# Patient Record
Sex: FEMALE | Race: WHITE | Employment: OTHER | URBAN - METROPOLITAN AREA
[De-identification: names, ages, dates, MRNs, and addresses within clinical notes are randomized per-mention and may not be internally consistent; named-entity substitution may affect disease eponyms.]

---

## 2017-05-25 ENCOUNTER — OFFICE VISIT (OUTPATIENT)
Dept: SURGERY | Age: 72
End: 2017-05-25

## 2017-05-25 ENCOUNTER — HOSPITAL ENCOUNTER (OUTPATIENT)
Dept: MAMMOGRAPHY | Age: 72
Discharge: HOME | End: 2017-05-25
Attending: SURGERY | Admitting: SURGERY

## 2017-05-25 VITALS
SYSTOLIC BLOOD PRESSURE: 127 MMHG | WEIGHT: 170 LBS | HEIGHT: 63 IN | BODY MASS INDEX: 30.12 KG/M2 | HEART RATE: 64 BPM | DIASTOLIC BLOOD PRESSURE: 61 MMHG

## 2017-05-25 DIAGNOSIS — Z12.31 ENCOUNTER FOR SCREENING MAMMOGRAM FOR BREAST CANCER: ICD-10-CM

## 2017-05-25 DIAGNOSIS — Z98.890 HISTORY OF BENIGN BREAST BIOPSY: Primary | ICD-10-CM

## 2017-05-25 DIAGNOSIS — Z12.31 SCREENING MAMMOGRAM, ENCOUNTER FOR: ICD-10-CM

## 2017-05-25 PROCEDURE — 99213 OFFICE O/P EST LOW 20 MIN: CPT | Performed by: SURGERY

## 2018-05-31 ENCOUNTER — HOSPITAL ENCOUNTER (OUTPATIENT)
Dept: OTHER | Age: 73
Discharge: HOME | End: 2018-05-31
Attending: SURGERY | Admitting: SURGERY

## 2018-06-18 ENCOUNTER — OFFICE VISIT (OUTPATIENT)
Dept: SURGERY | Age: 73
End: 2018-06-18

## 2018-06-18 ENCOUNTER — HOSPITAL ENCOUNTER (OUTPATIENT)
Dept: OTHER | Age: 73
Discharge: HOME | End: 2018-06-18
Attending: SURGERY | Admitting: SURGERY

## 2018-06-18 VITALS
WEIGHT: 175 LBS | SYSTOLIC BLOOD PRESSURE: 114 MMHG | BODY MASS INDEX: 31.01 KG/M2 | HEIGHT: 63 IN | DIASTOLIC BLOOD PRESSURE: 69 MMHG | TEMPERATURE: 97.4 F | HEART RATE: 61 BPM

## 2018-06-18 DIAGNOSIS — Z12.31 VISIT FOR SCREENING MAMMOGRAM: ICD-10-CM

## 2018-06-18 DIAGNOSIS — N60.19 FIBROCYSTIC BREAST DISEASE (FCBD), UNSPECIFIED LATERALITY: Primary | Chronic | ICD-10-CM

## 2018-06-18 PROCEDURE — 99213 OFFICE O/P EST LOW 20 MIN: CPT | Performed by: SURGERY

## 2019-06-17 ASSESSMENT — ENCOUNTER SYMPTOMS
RESPIRATORY NEGATIVE: 1
GASTROINTESTINAL NEGATIVE: 1
EYES NEGATIVE: 1

## 2019-06-17 NOTE — PROGRESS NOTES
CC: history of multiple benign breast biopsies      Ms. Nikhil Macdonald is a 72y.o.-year-old woman who presents today in follow up for a routine breast evaluation. She has been followed in the past by Dr. Mary Kay Diamond. INTERVAL HISTORY:  Since her last appointment, Ms. Nikhil Macdonald has been doing very well. She has no new complaints or general medical concerns. She has not noticed any new breast masses or skin changes including redness or dimpling. She has not noticed any nipple discharge. On 5/17/2016 she underwent bilateral screening mammogram which preliminarily shows no evidence of malignancy. On 5/25/2017 she underwent bilateral screening mammography. There are no suspicious findings suggestive of malignancy. BI-RADS 1. On 6/18/2018 she underwent bilateral screening mammography. There are no concerning findings suggestive of malignancy. BI-RADS 1. On 6/20/2019 she underwent bilateral screening mammography. There are no concerning findings suggestive of malignancy. BI-RADS 2. Past Medical History:   Diagnosis Date    Varner esophagus     Depression     Hypertension     Osteoporosis      Past Surgical History:   Procedure Laterality Date    APPENDECTOMY      qge 3    BREAST SURGERY      x 3 -- Biopsies     Review of Systems   Constitutional: Negative. HENT: Negative. Eyes: Negative. Respiratory: Negative. Cardiovascular: Negative. Gastrointestinal: Negative. Genitourinary: Negative. Musculoskeletal: Negative. Skin: Negative. Neurological: Negative. Endo/Heme/Allergies: Negative. Psychiatric/Behavioral: Negative. All other systems reviewed and are negative. Exam:  There were no vitals taken for this visit. The physical exam has been reviewed and updated where necessary. Constitutional: She appears well-nourished. No apparent distress. Breast: The patient was examined in the upright and supine position.   She has a \"D\" cup breast. Breasts are symmetrically ptotic with palpable fibroglandular densities. Bilateral well healed scars were present. Right: There were no new masses or changes in breast contour. There were no skin changes of the breast or nipple areolar complex. There was no nipple inversion or discharge. Left: There were no new masses or changes in breast contour. There were no skin changes of the breast or nipple areolar complex. There was no nipple inversion or discharge. There is no axillary lymphadenopathy palpated bilaterally. Head: Normocephalic and atraumatic. Eyes: EOM are normal. Pupils are equal, round, and reactive to light. Neck: Neck supple. No tracheal deviation present. Cardiovascular: regular rate. Extremities appear well perfused. Pulmonary: respirations are non-labored and without audible distress  Lymphatics: no palpable axillary or cervical lymphadenopathy. Skin: No rash noted. No erythema. Neurologic: alert and oriented. Assessment/Plan: Ms. Amy Bailey is a 72y.o. year-old woman who presents for a routine breast evaluation  There are no current signs of malignancy. I reviewed with Ms. Amy Bailey her most recent breast imaging. We discussed the recommendations for screening. Self breast evaluation was reviewed. Signs and symptoms of concern were reviewed. At this time: We will see Ms. Amy Bailey back in the office in 1 year for a clinical breast exam.  She will be due for a bilateral screening mammogram at that time which we will schedule for her. We discussed follow-up screening with nurse practitioner. All of Ms. Waldrop Reap questions were answered at this time but she was encouraged to call the office with any additional concerns. Approximately 15 minutes of time were spent in this visit of which 50% or more of the time was related to coordination of care.

## 2019-06-20 ENCOUNTER — HOSPITAL ENCOUNTER (OUTPATIENT)
Dept: WOMENS IMAGING | Age: 74
Discharge: HOME | End: 2019-06-20
Payer: MEDICARE

## 2019-06-20 ENCOUNTER — OFFICE VISIT (OUTPATIENT)
Dept: SURGERY | Age: 74
End: 2019-06-20
Payer: MEDICARE

## 2019-06-20 VITALS
SYSTOLIC BLOOD PRESSURE: 106 MMHG | BODY MASS INDEX: 29.06 KG/M2 | DIASTOLIC BLOOD PRESSURE: 63 MMHG | HEART RATE: 61 BPM | OXYGEN SATURATION: 98 % | HEIGHT: 63 IN | RESPIRATION RATE: 16 BRPM | WEIGHT: 164 LBS

## 2019-06-20 DIAGNOSIS — Z12.39 SCREENING BREAST EXAMINATION: ICD-10-CM

## 2019-06-20 DIAGNOSIS — Z98.890 HISTORY OF BENIGN BREAST BIOPSY: ICD-10-CM

## 2019-06-20 DIAGNOSIS — Z12.39 BREAST CANCER SCREENING: ICD-10-CM

## 2019-06-20 DIAGNOSIS — Z12.31 SCREENING MAMMOGRAM, ENCOUNTER FOR: Primary | ICD-10-CM

## 2019-06-20 PROCEDURE — 99213 OFFICE O/P EST LOW 20 MIN: CPT | Performed by: SURGERY

## 2019-06-20 PROCEDURE — 77067 SCR MAMMO BI INCL CAD: CPT

## 2020-05-13 ENCOUNTER — TELEPHONE (OUTPATIENT)
Dept: SURGERY | Age: 75
End: 2020-05-13

## 2020-05-13 NOTE — TELEPHONE ENCOUNTER
Called patient to cancel her appointment with Buck Tompkins on 6/25/20 due to the COVID-19 schedule change. If the patient calls back please infrom her that her appointment is not scheduled for 7/1/20 mammogram @10 a.m and office visit @11 a.m.

## 2020-09-01 ENCOUNTER — OFFICE VISIT (OUTPATIENT)
Dept: SURGERY | Age: 75
End: 2020-09-01
Payer: MEDICARE

## 2020-09-01 ENCOUNTER — HOSPITAL ENCOUNTER (OUTPATIENT)
Dept: WOMENS IMAGING | Age: 75
Discharge: HOME | End: 2020-09-01
Payer: MEDICARE

## 2020-09-01 VITALS
HEIGHT: 62 IN | WEIGHT: 170 LBS | TEMPERATURE: 97.1 F | HEART RATE: 62 BPM | RESPIRATION RATE: 18 BRPM | DIASTOLIC BLOOD PRESSURE: 70 MMHG | OXYGEN SATURATION: 94 % | BODY MASS INDEX: 31.28 KG/M2 | SYSTOLIC BLOOD PRESSURE: 108 MMHG

## 2020-09-01 PROCEDURE — 77063 BREAST TOMOSYNTHESIS BI: CPT

## 2020-09-01 PROCEDURE — 99213 OFFICE O/P EST LOW 20 MIN: CPT | Performed by: NURSE PRACTITIONER

## 2020-09-01 RX ORDER — ATENOLOL 50 MG/1
TABLET ORAL
COMMUNITY
Start: 2020-07-17

## 2020-09-01 RX ORDER — BENAZEPRIL HYDROCHLORIDE 40 MG/1
40 TABLET, FILM COATED ORAL DAILY
COMMUNITY
Start: 2019-09-02 | End: 2021-10-12 | Stop reason: SDUPTHER

## 2020-09-01 NOTE — PROGRESS NOTES
Tobacco Use    Smoking status: Never Smoker    Smokeless tobacco: Never Used   Substance Use Topics    Alcohol use: Yes     Alcohol/week: 0.0 standard drinks     Comment: Rarely    Drug use: No       Current Outpatient Medications on File Prior to Visit   Medication Sig Dispense Refill    benazepril (LOTENSIN) 40 MG tablet Take 40 mg by mouth daily      atenolol (TENORMIN) 50 MG tablet       Cholecalciferol (VITAMIN D) 2000 UNITS CAPS capsule Take  by mouth.  amLODIPine (NORVASC) 5 MG tablet       benazepril (LOTENSIN) 40 MG tablet       escitalopram (LEXAPRO) 10 MG tablet       NEXIUM 40 MG capsule       Zoledronic Acid (RECLAST IV) Infuse  intravenously. No current facility-administered medications on file prior to visit. Medications: documentation has been reviewed in the electronic medical record and patient office intake form. REVIEW OF SYSTEMS:  Constitutional: Negative for fever  HENT: Negative for sore throat  Eyes: Negative for redness   Respiratory: Negative for dyspnea, cough  Cardiovascular: Negative for chest pain  Gastrointestinal: Negative for vomiting, diarrhea   Genitourinary: Negative for hematuria   Musculoskeletal: Negative for arthralgias   Skin: Negative for rash  Neurological: Negative for syncope  Hematological: Negative for adenopathy  Psychiatric/Behavorial: Negative for anxiety     PHYSICAL EXAM:  /70 (Site: Right Lower Arm)   Pulse 62   Temp 97.1 °F (36.2 °C) (Temporal)   Resp 18   Ht 5' 2\" (1.575 m)   Wt 170 lb (77.1 kg)   SpO2 94%   BMI 31.09 kg/m²   Constitutional: She appearswell-nourished. No apparent distress. Breast: The patient was examined in the upright and supine position. She has a \"D cup breast. Breasts are symmetrically ptotic. Bilateral well healed scars present        Right: No new masses or changes in breast contour. No skin changes of the breast or nipple areolar complex. No nipple inversion or discharge.  No erythema, thickening (peau d'orange), or dimpling. Scattered areas of thickened fibroglandular tissue       Left: No new masses or changes in breast contour. No skin changes of the breast or nipple areolar complex. No nipple inversion or discharge. No erythema, thickening (peau d'orange), or dimpling. Scattered areas of thickened fibroglandular tissue  There is no axillary lymphadenopathy palpated bilaterally. Head: Normocephalic and atraumatic:   Eyes: EOM are normal. Pupils are equal, round, and reactive to light. Neck: Neck supple. No tracheal deviation present. No obvious mass. Cardiovascular: regular rate. Pulmonary: No accessory muscle use. Respirations non-labored and no wheezing. Lymphatics: No palpable supraclavicular, cervical, or axillary lymphadenopathy  Skin: No rash noted. No erythema. Neurologic: alert and oriented. Extremities: appear well perfused. No edema. No joint deformity        ASSESSMENT:  - Screening Breast Examination - stable breast examination and stable bilateral screening mammogram.   - Fibrocystic breast   - History of benign breast biopsy x3       PLAN:    Continue annual screening mammography with tomosynthesis   Continue annual clinical breast exams    Discussed the importance of breast awareness including the importance and technique of self breast exams   Today's imaging was reviewed and the results were discussed with the patient, all questions answered   Education provided for Healthy Lifestyle Recommendations: healthy diet, routine exercise, weight control, decreased alcohol consumption.  Follow up after annual mammogram in 1 year            ALDAIR Vazquez-CNP  [facility])   Surgical Breast Oncology   [phone]     All of the patient's questions were answered at this time however, she was encouraged to call the office with any further inquiries.  Approximately 15 minutes of time were spent in this visit of which 50% or more of the time was related to coordination of care.

## 2021-07-08 ENCOUNTER — APPOINTMENT (RX ONLY)
Dept: URBAN - METROPOLITAN AREA CLINIC 170 | Facility: CLINIC | Age: 76
Setting detail: DERMATOLOGY
End: 2021-07-08

## 2021-07-08 DIAGNOSIS — L92.0 GRANULOMA ANNULARE: ICD-10-CM

## 2021-07-08 PROCEDURE — ? ADDITIONAL NOTES

## 2021-07-08 PROCEDURE — 99203 OFFICE O/P NEW LOW 30 MIN: CPT

## 2021-07-08 PROCEDURE — ? COUNSELING

## 2021-07-08 PROCEDURE — ? PRESCRIPTION

## 2021-07-08 RX ORDER — TRIAMCINOLONE ACETONIDE 1 MG/G
OINTMENT TOPICAL
Qty: 1 | Refills: 2 | Status: ERX | COMMUNITY
Start: 2021-07-08

## 2021-07-08 RX ADMIN — TRIAMCINOLONE ACETONIDE: 1 OINTMENT TOPICAL at 00:00

## 2021-07-08 ASSESSMENT — LOCATION ZONE DERM: LOCATION ZONE: ARM

## 2021-07-08 ASSESSMENT — LOCATION DETAILED DESCRIPTION DERM: LOCATION DETAILED: LEFT PROXIMAL DORSAL FOREARM

## 2021-07-08 ASSESSMENT — LOCATION SIMPLE DESCRIPTION DERM: LOCATION SIMPLE: LEFT FOREARM

## 2021-07-08 NOTE — PROCEDURE: MIPS QUALITY
Quality 431: Preventive Care And Screening: Unhealthy Alcohol Use - Screening: Patient screened for unhealthy alcohol use using a single question and scores less than 2 times per year
Quality 226: Preventive Care And Screening: Tobacco Use: Screening And Cessation Intervention: Patient screened for tobacco use and is an ex/non-smoker
Quality 47: Advance Care Plan: Advance Care Planning discussed and documented in the medical record; patient did not wish or was not able to name a surrogate decision maker or provide an advance care plan.
Quality 111:Pneumonia Vaccination Status For Older Adults: Pneumococcal Vaccination Previously Received
Quality 130: Documentation Of Current Medications In The Medical Record: Current Medications Documented
Detail Level: Detailed

## 2021-07-08 NOTE — PROCEDURE: ADDITIONAL NOTES
Additional Notes: Patient consent was obtained to proceed with the visit and recommended plan of care after discussion of all risks and benefits, including the risks of COVID-19 exposure.
Detail Level: Simple
Additional Notes: Doubt malignancy, pt to call if lesion changes. No concerning features on dermoscopy.
Render Risk Assessment In Note?: no

## 2021-09-07 ENCOUNTER — HOSPITAL ENCOUNTER (OUTPATIENT)
Dept: WOMENS IMAGING | Age: 76
Discharge: HOME | End: 2021-09-07
Payer: MEDICARE

## 2021-09-07 VITALS — HEIGHT: 62 IN | BODY MASS INDEX: 32.02 KG/M2 | WEIGHT: 174 LBS

## 2021-09-07 DIAGNOSIS — Z12.39 ENCOUNTER FOR SCREENING BREAST EXAMINATION: ICD-10-CM

## 2021-09-07 PROCEDURE — 77063 BREAST TOMOSYNTHESIS BI: CPT

## 2021-09-13 ENCOUNTER — TELEPHONE (OUTPATIENT)
Dept: SURGERY | Age: 76
End: 2021-09-13

## 2021-09-13 NOTE — TELEPHONE ENCOUNTER
Left voicemail for patient to call back to reschedule appointment. [place] [employer] is out of the office.

## 2021-10-06 ENCOUNTER — APPOINTMENT (RX ONLY)
Dept: URBAN - METROPOLITAN AREA CLINIC 170 | Facility: CLINIC | Age: 76
Setting detail: DERMATOLOGY
End: 2021-10-06

## 2021-10-06 DIAGNOSIS — D22 MELANOCYTIC NEVI: ICD-10-CM | Status: STABLE

## 2021-10-06 DIAGNOSIS — L81.4 OTHER MELANIN HYPERPIGMENTATION: ICD-10-CM | Status: STABLE

## 2021-10-06 DIAGNOSIS — L92.0 GRANULOMA ANNULARE: ICD-10-CM | Status: INADEQUATELY CONTROLLED

## 2021-10-06 DIAGNOSIS — D18.0 HEMANGIOMA: ICD-10-CM | Status: STABLE

## 2021-10-06 DIAGNOSIS — L82.1 OTHER SEBORRHEIC KERATOSIS: ICD-10-CM | Status: STABLE

## 2021-10-06 PROBLEM — D22.5 MELANOCYTIC NEVI OF TRUNK: Status: ACTIVE | Noted: 2021-10-06

## 2021-10-06 PROBLEM — D18.01 HEMANGIOMA OF SKIN AND SUBCUTANEOUS TISSUE: Status: ACTIVE | Noted: 2021-10-06

## 2021-10-06 PROCEDURE — ? EDUCATIONAL RESOURCES PROVIDED

## 2021-10-06 PROCEDURE — 99214 OFFICE O/P EST MOD 30 MIN: CPT

## 2021-10-06 PROCEDURE — ? COUNSELING

## 2021-10-06 PROCEDURE — ? TREATMENT REGIMEN

## 2021-10-06 PROCEDURE — ? PRESCRIPTION MEDICATION MANAGEMENT

## 2021-10-06 PROCEDURE — ? ADDITIONAL NOTES

## 2021-10-06 PROCEDURE — ? PHOTO-DOCUMENTATION

## 2021-10-06 PROCEDURE — ? FULL BODY SKIN EXAM

## 2021-10-06 PROCEDURE — ? SUNSCREEN RECOMMENDATIONS

## 2021-10-06 ASSESSMENT — LOCATION SIMPLE DESCRIPTION DERM
LOCATION SIMPLE: RIGHT LOWER BACK
LOCATION SIMPLE: RIGHT SHOULDER
LOCATION SIMPLE: UPPER BACK
LOCATION SIMPLE: LEFT FOREARM
LOCATION SIMPLE: ABDOMEN

## 2021-10-06 ASSESSMENT — LOCATION DETAILED DESCRIPTION DERM
LOCATION DETAILED: INFERIOR THORACIC SPINE
LOCATION DETAILED: RIGHT INFERIOR MEDIAL MIDBACK
LOCATION DETAILED: EPIGASTRIC SKIN
LOCATION DETAILED: RIGHT ANTERIOR SHOULDER
LOCATION DETAILED: LEFT PROXIMAL ULNAR DORSAL FOREARM

## 2021-10-06 ASSESSMENT — LOCATION ZONE DERM
LOCATION ZONE: ARM
LOCATION ZONE: TRUNK

## 2021-10-06 NOTE — HPI: EVALUATION OF SKIN LESION(S)
What Type Of Note Output Would You Prefer (Optional)?: Bullet Format
Hpi Title: Evaluation of Skin Lesions
How Severe Are Your Spot(S)?: mild
Have Your Spot(S) Been Treated In The Past?: has not been treated
Additional History: Was seeing a other dermatologist, but her insurance changed and they no longer take it her insurance . She is her for her yearly skin check.

## 2021-10-06 NOTE — PROCEDURE: PRESCRIPTION MEDICATION MANAGEMENT
Initiate Treatment: Since patient never started triamcinolone, recommend she call pharmacy and start Triam 0.1% cream b.i.d. X up to two weeks PRN redness
Detail Level: Detailed
Render In Strict Bullet Format?: No

## 2021-10-12 ENCOUNTER — OFFICE VISIT (OUTPATIENT)
Dept: SURGERY | Age: 76
End: 2021-10-12
Payer: MEDICARE

## 2021-10-12 VITALS
HEART RATE: 66 BPM | DIASTOLIC BLOOD PRESSURE: 70 MMHG | SYSTOLIC BLOOD PRESSURE: 126 MMHG | WEIGHT: 175 LBS | BODY MASS INDEX: 32.01 KG/M2 | OXYGEN SATURATION: 95 %

## 2021-10-12 DIAGNOSIS — Z98.890 HISTORY OF BENIGN BREAST BIOPSY: ICD-10-CM

## 2021-10-12 DIAGNOSIS — Z12.31 ENCOUNTER FOR SCREENING MAMMOGRAM FOR BREAST CANCER: ICD-10-CM

## 2021-10-12 DIAGNOSIS — N60.19 FIBROCYSTIC BREAST DISEASE (FCBD), UNSPECIFIED LATERALITY: ICD-10-CM

## 2021-10-12 DIAGNOSIS — Z12.39 ENCOUNTER FOR SCREENING BREAST EXAMINATION: Primary | ICD-10-CM

## 2021-10-12 PROCEDURE — 99213 OFFICE O/P EST LOW 20 MIN: CPT | Performed by: NURSE PRACTITIONER

## 2021-10-12 NOTE — PATIENT INSTRUCTIONS
overlap. ? Use three levels of pressure to feel of all your breast tissue. Use light pressure to feel the tissue close to the skin surface. Use medium pressure to feel a little deeper. Use firm pressure to feel your tissue close to your breastbone and ribs. Use each pressure level to feel your breast tissue before moving on to the next spot. ? Check your entire breast, moving up and down as if following a strip from the collarbone to the bra line, and from the armpit to the ribs. Repeat until you have covered the entire breast.  ? Repeat this procedure for your left breast, using the pads of the 3 middle fingers of your right hand. · To examine your breasts while in the shower:  ? Place one arm over your head and lightly soap your breast on that side. ? Using the pads of your fingers, gently move your hand over your breast (in the strip pattern described above), feeling carefully for any lumps or changes. ? Repeat for the other breast.  · Have your doctor inspect anything you notice to see if you need further testing. Where can you learn more? Go to https://[facility]. org and sign in to your [employer] account. Enter P148 in the [employer] box to learn more about \"Breast Self-Exam: Care Instructions. \"     If you do not have an account, please click on the \"Sign Up Now\" link. Current as of: December 17, 2020               Content Version: 13.0  © 4617-9790 [employer]. Care instructions adapted under license by [facility] ([facility]). If you have questions about a medical condition or this instruction, always ask your healthcare professional. [place] [address] any warranty or liability for your use of this information.

## 2021-10-12 NOTE — PROGRESS NOTES
Surgical Breast Oncology        CC: One year [id number] for one-year breast check and mammogram     HPI: Francois Salas is a 70 y.o. woman here for annual follow up for routine breast evaluation. She has been followed in the past by Dr. Cindy Schrader and most recently Dr. Kurt Henson. She states that she does not routinely perform self breast evaluations, however has not noticed any new abnormalities such as masses, skin changes, color changes,nipple discharge, or changes to the nipple-areolar complex. No personal history of cancer. Mammogram today reviewed, discussed with the patient and documented below. INTERVAL HISTORY:  On 5/17/2016 she underwent bilateral screening mammogram which preliminarily shows no evidence of malignancy.     On 5/25/2017 she underwent bilateral screening mammography. There are no suspicious findings suggestive of malignancy. BI-RADS 1.     On 6/18/2018 she underwent bilateral screening mammography. There are no concerning findings suggestive of malignancy. BI-RADS 1.     On 6/20/2019 she underwent bilateral screening mammography. There are no concerning findings suggestive of malignancy. BI-RADS 2. On 9/1/2020 she underwent bilateral screening mammography. Stable postoperative changes noted in bilateral breast.  Stable microcalcifications are noted in both breast.  There are no concerning findings suggestive of malignancy. BI-RADS 2. On 9/7/2021 children bilateral screening mammography. No new concerning findings suggestive of malignancy. BI-RADS 1.       Past Medical History:   Diagnosis Date    Varner esophagus     Depression     Hypertension     Osteoporosis        Past Surgical History:   Procedure Laterality Date    APPENDECTOMY      qge 3    BREAST BIOPSY      BREAST SURGERY      x 3 -- Biopsies    HYSTERECTOMY      OVARY REMOVAL         No family history on file.     Allergies as of 10/12/2021 - Fully Reviewed 10/12/2021   Allergen Reaction Noted    Sulfa antibiotics Shortness Of Breath 04/25/2013    Lipitor Other (See Comments) 04/25/2013    Hydrochlorothiazide  04/16/2015       Social History     Tobacco Use    Smoking status: Never Smoker    Smokeless tobacco: Never Used   Vaping Use    Vaping Use: Never used   Substance Use Topics    Alcohol use: Yes     Alcohol/week: 0.0 standard drinks     Comment: Rarely    Drug use: No       Current Outpatient Medications on File Prior to Visit   Medication Sig Dispense Refill    atenolol (TENORMIN) 50 MG tablet       Cholecalciferol (VITAMIN D) 2000 UNITS CAPS capsule Take  by mouth.  amLODIPine (NORVASC) 5 MG tablet       benazepril (LOTENSIN) 40 MG tablet       escitalopram (LEXAPRO) 10 MG tablet       NEXIUM 40 MG capsule        No current facility-administered medications on file prior to visit. Medications: documentation has been reviewed in the electronic medical record and patient office intake form. REVIEW OF SYSTEMS:  Constitutional: Negative for fever  HENT: Negative for sore throat  Eyes: Negative for redness   Respiratory: Negative for dyspnea, cough  Cardiovascular: Negative for chest pain  Gastrointestinal: Negative for vomiting, diarrhea   Genitourinary: Negative for hematuria   Musculoskeletal: Negative for arthralgias   Skin: Negative for rash  Neurological: Negative for syncope  Hematological: Negative for adenopathy  Psychiatric/Behavorial: Negative for anxiety     PHYSICAL EXAM:  /70   Pulse 66   Wt 175 lb (79.4 kg)   SpO2 95%   BMI 32.01 kg/m²   Constitutional: She appearswell-nourished. No apparent distress. Breast: The patient was examined in the upright and supine position. She has a \"D cup breast. Breasts are symmetrically ptotic. Bilateral well healed scars present        Right: No new masses or changes in breast contour. No skin changes of the breast or nipple areolar complex. No nipple inversion or discharge.  No erythema, thickening (peau d'orange), or dimpling. Scattered areas of thickened fibroglandular tissue       Left: No new masses or changes in breast contour. No skin changes of the breast or nipple areolar complex. No nipple inversion or discharge. No erythema, thickening (peau d'orange), or dimpling. Scattered areas of thickened fibroglandular tissue  There is no axillary lymphadenopathy palpated bilaterally. Head: Normocephalic and atraumatic:   Eyes: EOM are normal. Pupils are equal, round, and reactive to light. Neck: Neck supple. No tracheal deviation present. No obvious mass. Lymphatics: No palpable supraclavicular, cervical, or axillary lymphadenopathy  Skin: No rash noted. No erythema. Neurologic: alert and oriented. Extremities: appear well perfused. ASSESSMENT:  - Screening Breast Examination - stable breast examination and stable bilateral screening mammogram.   - Fibrocystic breast   - History of benign breast biopsy x3       PLAN:    Continue annual screening mammography with tomosynthesis   Continue annual clinical breast exams    Discussed the importance of breast awareness including the importance and technique of self breast exams   Today's imaging was reviewed and the results were discussed with the patient, all questions answered   Education provided for Healthy Lifestyle Recommendations: healthy diet, routine exercise, weight control, decreased alcohol consumption.  Follow up after annual mammogram in 1 year            ALDAIR Hummel-CNP  [facility])   Surgical Breast Oncology   [phone]     All of the patient's questions were answered at this time however, she was encouraged to call the office with any further inquiries. Approximately 20 minutes of time were spent in preparation, direct patient contact, counseling, care coordination, documentation and activities otherwise related to this encounter.

## 2022-09-21 ENCOUNTER — OFFICE VISIT (OUTPATIENT)
Dept: SURGERY | Age: 77
End: 2022-09-21
Payer: MEDICARE

## 2022-09-21 ENCOUNTER — HOSPITAL ENCOUNTER (OUTPATIENT)
Dept: WOMENS IMAGING | Age: 77
Discharge: HOME | End: 2022-09-21
Payer: MEDICARE

## 2022-09-21 VITALS — HEIGHT: 62 IN | WEIGHT: 173 LBS | BODY MASS INDEX: 31.83 KG/M2

## 2022-09-21 VITALS — BODY MASS INDEX: 31.93 KG/M2 | DIASTOLIC BLOOD PRESSURE: 72 MMHG | SYSTOLIC BLOOD PRESSURE: 114 MMHG | WEIGHT: 174.6 LBS

## 2022-09-21 DIAGNOSIS — Z12.39 ENCOUNTER FOR SCREENING BREAST EXAMINATION: Primary | ICD-10-CM

## 2022-09-21 DIAGNOSIS — Z12.31 ENCOUNTER FOR SCREENING MAMMOGRAM FOR BREAST CANCER: ICD-10-CM

## 2022-09-21 DIAGNOSIS — N60.19 FIBROCYSTIC BREAST DISEASE (FCBD), UNSPECIFIED LATERALITY: ICD-10-CM

## 2022-09-21 DIAGNOSIS — Z98.890 HISTORY OF BENIGN BREAST BIOPSY: ICD-10-CM

## 2022-09-21 DIAGNOSIS — Z12.31 ENCOUNTER FOR SCREENING MAMMOGRAM FOR BREAST CANCER: Primary | ICD-10-CM

## 2022-09-21 DIAGNOSIS — Z12.31 BREAST CANCER SCREENING BY MAMMOGRAM: ICD-10-CM

## 2022-09-21 PROCEDURE — 1123F ACP DISCUSS/DSCN MKR DOCD: CPT | Performed by: NURSE PRACTITIONER

## 2022-09-21 PROCEDURE — 99213 OFFICE O/P EST LOW 20 MIN: CPT | Performed by: NURSE PRACTITIONER

## 2022-09-21 PROCEDURE — 77063 BREAST TOMOSYNTHESIS BI: CPT

## 2022-09-21 NOTE — PROGRESS NOTES
Surgical Breast Oncology        CC: One year [id number] for one-year breast check and mammogram     HPI: Rosario Giron is a 78 y. o. woman here for annual follow up for routine breast evaluation. She has been followed in the past by Dr. Shaheen Yoder and most recently Dr. Noelle Romberg. She states that she does not routinely perform self breast evaluations, however has not noticed any new abnormalities such as masses, skin changes, color changes,nipple discharge, or changes to the nipple-areolar complex. No personal history of cancer. Mammogram today reviewed, discussed with the patient and documented below. INTERVAL HISTORY:  On 5/17/2016 she underwent bilateral screening mammogram which preliminarily shows no evidence of malignancy. On 5/25/2017 she underwent bilateral screening mammography. There are no suspicious findings suggestive of malignancy. BI-RADS 1. On 6/18/2018 she underwent bilateral screening mammography. There are no concerning findings suggestive of malignancy. BI-RADS 1. On 6/20/2019 she underwent bilateral screening mammography. There are no concerning findings suggestive of malignancy. BI-RADS 2. On 9/1/2020 she underwent bilateral screening mammography. Stable postoperative changes noted in bilateral breast.  Stable microcalcifications are noted in both breast.  There are no concerning findings suggestive of malignancy. BI-RADS 2. On 9/7/2021 she underwent bilateral screening mammography. No new concerning findings suggestive of malignancy. BI-RADS 1. On 9/21/2022 she had her bilateral screening mammography. Stable calcifications noted in bilateral breast.  Stable postoperative changes noted on bilateral breast. no new concerning findings suggestive of malignancy. BI-RADS 2.        Past Medical History:   Diagnosis Date    Varner esophagus     Depression     Hypertension     Osteoporosis        Past Surgical History:   Procedure Laterality Date APPENDECTOMY      qge 3    BREAST BIOPSY      BREAST SURGERY      x 3 -- Biopsies    HYSTERECTOMY      OOPHORECTOMY         No family history on file. Allergies as of 09/21/2022 - Fully Reviewed 09/21/2022   Allergen Reaction Noted    Sulfa antibiotics Shortness Of Breath 04/25/2013    Lipitor Other (See Comments) 04/25/2013    Hydrochlorothiazide  04/16/2015       Social History     Tobacco Use    Smoking status: Never    Smokeless tobacco: Never   Vaping Use    Vaping Use: Never used   Substance Use Topics    Alcohol use: Yes     Alcohol/week: 0.0 standard drinks     Comment: Rarely    Drug use: No       Current Outpatient Medications on File Prior to Visit   Medication Sig Dispense Refill    atenolol (TENORMIN) 50 MG tablet       Cholecalciferol (VITAMIN D) 2000 UNITS CAPS capsule Take  by mouth. amLODIPine (NORVASC) 5 MG tablet       benazepril (LOTENSIN) 40 MG tablet       escitalopram (LEXAPRO) 10 MG tablet       NEXIUM 40 MG capsule        No current facility-administered medications on file prior to visit. Medications: documentation has been reviewed in the electronic medical record and patient office intake form. REVIEW OF SYSTEMS:  Constitutional: Negative for fever  HENT: Negative for sore throat  Eyes: Negative for redness   Respiratory: Negative for dyspnea, cough  Cardiovascular: Negative for chest pain  Gastrointestinal: Negative for vomiting, diarrhea   Genitourinary: Negative for hematuria   Musculoskeletal: Negative for arthralgias   Skin: Negative for rash  Neurological: Negative for syncope  Hematological: Negative for adenopathy  Psychiatric/Behavorial: Negative for anxiety     PHYSICAL EXAM:  /72 (Site: Right Upper Arm, Position: Sitting, Cuff Size: Medium Adult)   Wt 174 lb 9.6 oz (79.2 kg)   BMI 31.93 kg/m²   Constitutional: She appearswell-nourished. No apparent distress. Breast: The patient was examined in the upright and supine position.   She has a \"D cup breast. Breasts are symmetrically ptotic. Bilateral well healed scars present        Right: No new masses or changes in breast contour. No skin changes of the breast or nipple areolar complex. No nipple inversion or discharge. No erythema, thickening (peau d'orange), or dimpling. Scattered areas of thickened fibroglandular tissue       Left: No new masses or changes in breast contour. No skin changes of the breast or nipple areolar complex. No nipple inversion or discharge. No erythema, thickening (peau d'orange), or dimpling. Scattered areas of thickened fibroglandular tissue  There is no axillary lymphadenopathy palpated bilaterally. Head: Normocephalic and atraumatic:   Eyes: EOM are normal. Pupils are equal, round, and reactive to light. Neck: Neck supple. No tracheal deviation present. No obvious mass. Lymphatics: No palpable supraclavicular, cervical, or axillary lymphadenopathy  Skin: No rash noted. No erythema. Neurologic: alert and oriented. Extremities: appear well perfused. ASSESSMENT:  - Screening Breast Examination - stable breast examination and stable bilateral screening mammogram.   - Fibrocystic breast   - History of benign breast biopsy x3       PLAN:   Continue annual screening mammography with tomosynthesis  Continue annual clinical breast exams   Discussed the importance of breast awareness including the importance and technique of self breast exams  Today's imaging was reviewed and the results were discussed with the patient, all questions answered  Education provided for Healthy Lifestyle Recommendations: healthy diet, routine exercise, weight control, decreased alcohol consumption. Follow up after annual mammogram in 1 year            ALDAIR Rivera-CNP  [facility])   Surgical Breast Oncology   [phone]     All of the patient's questions were answered at this time however, she was encouraged to call the office with any further inquiries.     Approximately 20 minutes of time were spent in preparation, direct patient contact, counseling, care coordination, documentation and activities otherwise related to this encounter.

## 2022-09-21 NOTE — PATIENT INSTRUCTIONS
Healthy Lifestyle Recommendations: healthy diet (decrease consumption of red meat, increase fresh fruits and vegetables), decreased alcohol consumption (less than 4 drinks/week), adequate sleep (goal 6-8 hours), routine exercise (goal 150 minutes/week or greater), weight control. Patient Education        Breast Self-Exam: Care Instructions  Your Care Instructions     A breast self-exam is when you check your breasts for lumps or changes. This regular exam helps you learn how your breasts normally look and feel. Most breast problems or changes are not because of cancer. Breast self-exam is not a substitute for a mammogram. Having regular breast exams by your doctor and regular mammograms improve your chances of finding any problems with your breasts. Some women set a time each month to do a step-by-step breast self-exam. Other women like a less formal system. They might look at their breasts as they brush their teeth, or feel their breasts once in a while in the shower. If you notice a change in your breast, tell your doctor. Follow-up care is a key part of your treatment and safety. Be sure to make and go to all appointments, and call your doctor if you are having problems. It's also a good idea to know your test results and keep a list of the medicines you take. How do you do a breast self-exam?  The best time to examine your breasts is usually one week after your menstrual period begins. Your breasts should not be tender then. If you do not have periods, you might do your exam on a day of the month that is easy to remember. To examine your breasts:  Remove all your clothes above the waist and lie down. When you are lying down, your breast tissue spreads evenly over your chest wall, which makes it easier to feel all your breast tissue. Use the pads--not the fingertips--of the 3 middle fingers of your left hand to check your right breast. Move your fingers slowly in small coin-sized circles that overlap.   Use three levels of pressure to feel of all your breast tissue. Use light pressure to feel the tissue close to the skin surface. Use medium pressure to feel a little deeper. Use firm pressure to feel your tissue close to your breastbone and ribs. Use each pressure level to feel your breast tissue before moving on to the next spot. Check your entire breast, moving up and down as if following a strip from the collarbone to the bra line, and from the armpit to the ribs. Repeat until you have covered the entire breast.  Repeat this procedure for your left breast, using the pads of the 3 middle fingers of your right hand. To examine your breasts while in the shower:  Place one arm over your head and lightly soap your breast on that side. Using the pads of your fingers, gently move your hand over your breast (in the strip pattern described above), feeling carefully for any lumps or changes. Repeat for the other breast.  Have your doctor inspect anything you notice to see if you need further testing. Where can you learn more? Go to [email or web address] org and sign in to your [employer] account. Enter P148 in the [facility] box to learn more about \"Breast Self-Exam: Care Instructions. \"     If you do not have an account, please click on the \"Sign Up Now\" link. Current as of: November 22, 2021               Content Version: 13.4  © 9088-5989 [employer]. Care instructions adapted under license by [facility] ([facility]). If you have questions about a medical condition or this instruction, always ask your healthcare professional. [place] [address] any warranty or liability for your use of this information.

## 2022-10-07 ENCOUNTER — APPOINTMENT (RX ONLY)
Dept: URBAN - METROPOLITAN AREA CLINIC 170 | Facility: CLINIC | Age: 77
Setting detail: DERMATOLOGY
End: 2022-10-07

## 2022-10-07 DIAGNOSIS — L81.4 OTHER MELANIN HYPERPIGMENTATION: ICD-10-CM | Status: STABLE

## 2022-10-07 DIAGNOSIS — D22 MELANOCYTIC NEVI: ICD-10-CM | Status: STABLE

## 2022-10-07 DIAGNOSIS — D18.0 HEMANGIOMA: ICD-10-CM | Status: STABLE

## 2022-10-07 DIAGNOSIS — L82.1 OTHER SEBORRHEIC KERATOSIS: ICD-10-CM | Status: STABLE

## 2022-10-07 PROBLEM — D22.5 MELANOCYTIC NEVI OF TRUNK: Status: ACTIVE | Noted: 2022-10-07

## 2022-10-07 PROBLEM — D18.01 HEMANGIOMA OF SKIN AND SUBCUTANEOUS TISSUE: Status: ACTIVE | Noted: 2022-10-07

## 2022-10-07 PROCEDURE — ? COUNSELING

## 2022-10-07 PROCEDURE — ? SUNSCREEN RECOMMENDATIONS

## 2022-10-07 PROCEDURE — 99213 OFFICE O/P EST LOW 20 MIN: CPT

## 2022-10-07 PROCEDURE — ? FULL BODY SKIN EXAM

## 2022-10-07 PROCEDURE — ? TREATMENT REGIMEN

## 2022-10-07 PROCEDURE — ? ADDITIONAL NOTES

## 2022-10-07 ASSESSMENT — LOCATION DETAILED DESCRIPTION DERM
LOCATION DETAILED: RIGHT INFERIOR MEDIAL MIDBACK
LOCATION DETAILED: INFERIOR THORACIC SPINE
LOCATION DETAILED: RIGHT ANTERIOR SHOULDER
LOCATION DETAILED: EPIGASTRIC SKIN

## 2022-10-07 ASSESSMENT — LOCATION SIMPLE DESCRIPTION DERM
LOCATION SIMPLE: RIGHT SHOULDER
LOCATION SIMPLE: RIGHT LOWER BACK
LOCATION SIMPLE: ABDOMEN
LOCATION SIMPLE: UPPER BACK

## 2022-10-07 ASSESSMENT — LOCATION ZONE DERM
LOCATION ZONE: ARM
LOCATION ZONE: TRUNK

## 2022-10-07 NOTE — HPI: EVALUATION OF SKIN LESION(S)
Hpi Title: Evaluation of Skin Lesions
Have Your Spot(S) Been Treated In The Past?: has not been treated
Additional History: Patient states she has a spot on her back that was sore

## 2023-10-04 ENCOUNTER — HOSPITAL ENCOUNTER (OUTPATIENT)
Dept: ULTRASOUND IMAGING | Age: 78
Discharge: HOME | End: 2023-10-04
Payer: MEDICARE

## 2023-10-04 ENCOUNTER — OFFICE VISIT (OUTPATIENT)
Dept: SURGERY | Age: 78
End: 2023-10-04
Payer: MEDICARE

## 2023-10-04 ENCOUNTER — HOSPITAL ENCOUNTER (OUTPATIENT)
Dept: WOMENS IMAGING | Age: 78
Discharge: HOME | End: 2023-10-04
Payer: MEDICARE

## 2023-10-04 VITALS
OXYGEN SATURATION: 99 % | BODY MASS INDEX: 32.68 KG/M2 | RESPIRATION RATE: 18 BRPM | HEIGHT: 62 IN | WEIGHT: 177.6 LBS | HEART RATE: 58 BPM

## 2023-10-04 VITALS — BODY MASS INDEX: 27.97 KG/M2 | HEIGHT: 66 IN | WEIGHT: 174 LBS

## 2023-10-04 DIAGNOSIS — R92.8 ABNORMAL FINDING ON BREAST IMAGING: ICD-10-CM

## 2023-10-04 DIAGNOSIS — Z12.31 ENCOUNTER FOR SCREENING MAMMOGRAM FOR BREAST CANCER: ICD-10-CM

## 2023-10-04 DIAGNOSIS — Z98.890 HISTORY OF BENIGN BREAST BIOPSY: ICD-10-CM

## 2023-10-04 DIAGNOSIS — Z12.39 ENCOUNTER FOR SCREENING BREAST EXAMINATION: ICD-10-CM

## 2023-10-04 DIAGNOSIS — N60.19 FIBROCYSTIC BREAST DISEASE (FCBD), UNSPECIFIED LATERALITY: ICD-10-CM

## 2023-10-04 DIAGNOSIS — R92.8 ABNORMAL FINDING ON BREAST IMAGING: Primary | ICD-10-CM

## 2023-10-04 PROCEDURE — 77063 BREAST TOMOSYNTHESIS BI: CPT

## 2023-10-04 PROCEDURE — 1123F ACP DISCUSS/DSCN MKR DOCD: CPT | Performed by: NURSE PRACTITIONER

## 2023-10-04 PROCEDURE — 76642 ULTRASOUND BREAST LIMITED: CPT

## 2023-10-04 PROCEDURE — 99213 OFFICE O/P EST LOW 20 MIN: CPT | Performed by: NURSE PRACTITIONER

## 2023-10-04 NOTE — PROGRESS NOTES
provided for Healthy Lifestyle Recommendations: healthy diet, routine exercise, weight control, decreased alcohol consumption. Follow up based on ultrasound results            ALDAIR Holcomb-CNP  [facility])   Surgical Breast Oncology   [phone]     All of the patient's questions were answered at this time however, she was encouraged to call the office with any further inquiries. Approximately 20 minutes of time were spent in preparation, direct patient contact, counseling, care coordination, documentation and activities otherwise related to this encounter.

## 2023-10-05 ENCOUNTER — TELEPHONE (OUTPATIENT)
Dept: SURGERY | Age: 78
End: 2023-10-05

## 2023-10-05 NOTE — TELEPHONE ENCOUNTER
Patient called to scheduled 6 month mammogram in [facility] location.  [phone]

## 2023-10-11 ENCOUNTER — APPOINTMENT (RX ONLY)
Dept: URBAN - METROPOLITAN AREA CLINIC 170 | Facility: CLINIC | Age: 78
Setting detail: DERMATOLOGY
End: 2023-10-11

## 2023-10-11 DIAGNOSIS — L82.1 OTHER SEBORRHEIC KERATOSIS: ICD-10-CM | Status: STABLE

## 2023-10-11 DIAGNOSIS — D22 MELANOCYTIC NEVI: ICD-10-CM | Status: STABLE

## 2023-10-11 DIAGNOSIS — D18.0 HEMANGIOMA: ICD-10-CM | Status: STABLE

## 2023-10-11 DIAGNOSIS — L81.4 OTHER MELANIN HYPERPIGMENTATION: ICD-10-CM | Status: STABLE

## 2023-10-11 PROBLEM — D18.01 HEMANGIOMA OF SKIN AND SUBCUTANEOUS TISSUE: Status: ACTIVE | Noted: 2023-10-11

## 2023-10-11 PROBLEM — D22.5 MELANOCYTIC NEVI OF TRUNK: Status: ACTIVE | Noted: 2023-10-11

## 2023-10-11 PROBLEM — D48.5 NEOPLASM OF UNCERTAIN BEHAVIOR OF SKIN: Status: ACTIVE | Noted: 2023-10-11

## 2023-10-11 PROCEDURE — 99213 OFFICE O/P EST LOW 20 MIN: CPT | Mod: 25

## 2023-10-11 PROCEDURE — ? COUNSELING

## 2023-10-11 PROCEDURE — ? TREATMENT REGIMEN

## 2023-10-11 PROCEDURE — ? PHOTO-DOCUMENTATION

## 2023-10-11 PROCEDURE — ? FULL BODY SKIN EXAM

## 2023-10-11 PROCEDURE — ? BIOPSY BY SHAVE METHOD

## 2023-10-11 PROCEDURE — 11102 TANGNTL BX SKIN SINGLE LES: CPT

## 2023-10-11 PROCEDURE — ? SUNSCREEN RECOMMENDATIONS

## 2023-10-11 ASSESSMENT — LOCATION SIMPLE DESCRIPTION DERM
LOCATION SIMPLE: RIGHT LOWER BACK
LOCATION SIMPLE: UPPER BACK
LOCATION SIMPLE: LOWER BACK
LOCATION SIMPLE: ABDOMEN
LOCATION SIMPLE: RIGHT SHOULDER

## 2023-10-11 ASSESSMENT — LOCATION DETAILED DESCRIPTION DERM
LOCATION DETAILED: SUPERIOR LUMBAR SPINE
LOCATION DETAILED: INFERIOR THORACIC SPINE
LOCATION DETAILED: RIGHT ANTERIOR SHOULDER
LOCATION DETAILED: EPIGASTRIC SKIN
LOCATION DETAILED: RIGHT INFERIOR MEDIAL MIDBACK

## 2023-10-11 ASSESSMENT — LOCATION ZONE DERM
LOCATION ZONE: ARM
LOCATION ZONE: TRUNK

## 2023-10-11 NOTE — HPI: EVALUATION OF SKIN LESION(S)
What Type Of Note Output Would You Prefer (Optional)?: Bullet Format
Hpi Title: Evaluation of Skin Lesions
How Severe Are Your Spot(S)?: mild
Have Your Spot(S) Been Treated In The Past?: has not been treated
Additional History: Patient has one spot on the back that she can feel but no bleeding

## 2023-10-11 NOTE — PROCEDURE: MIPS QUALITY
Quality 111:Pneumonia Vaccination Status For Older Adults: Patient received any pneumococcal conjugate or polysaccharide vaccine on or after their 60th birthday and before the end of the measurement period
Detail Level: Detailed
Quality 130: Documentation Of Current Medications In The Medical Record: Current Medications Documented
Quality 110: Preventive Care And Screening: Influenza Immunization: Influenza Immunization Administered during Influenza season
Quality 47: Advance Care Plan: Advance Care Planning discussed and documented; advance care plan or surrogate decision maker documented in the medical record.
Quality 226: Preventive Care And Screening: Tobacco Use: Screening And Cessation Intervention: Patient screened for tobacco use and is an ex/non-smoker
Quality 431: Preventive Care And Screening: Unhealthy Alcohol Use - Screening: Patient not identified as an unhealthy alcohol user when screened for unhealthy alcohol use using a systematic screening method

## 2023-10-19 DIAGNOSIS — R92.8 ABNORMAL FINDING ON BREAST IMAGING: Primary | ICD-10-CM

## 2024-04-15 ENCOUNTER — HOSPITAL ENCOUNTER (OUTPATIENT)
Dept: MAMMOGRAPHY | Age: 79
Discharge: HOME | End: 2024-04-15
Payer: MEDICARE

## 2024-04-15 ENCOUNTER — OFFICE VISIT (OUTPATIENT)
Dept: SURGERY | Age: 79
End: 2024-04-15
Payer: MEDICARE

## 2024-04-15 VITALS
WEIGHT: 177 LBS | RESPIRATION RATE: 18 BRPM | HEART RATE: 66 BPM | BODY MASS INDEX: 32.57 KG/M2 | OXYGEN SATURATION: 97 % | HEIGHT: 62 IN

## 2024-04-15 VITALS — HEIGHT: 62 IN | BODY MASS INDEX: 32.57 KG/M2 | WEIGHT: 177 LBS

## 2024-04-15 DIAGNOSIS — R92.8 ABNORMAL FINDING ON BREAST IMAGING: ICD-10-CM

## 2024-04-15 DIAGNOSIS — R92.8 ABNORMAL FINDING ON BREAST IMAGING: Primary | ICD-10-CM

## 2024-04-15 DIAGNOSIS — Z98.890 HISTORY OF BENIGN BREAST BIOPSY: ICD-10-CM

## 2024-04-15 DIAGNOSIS — Z12.39 ENCOUNTER FOR SCREENING BREAST EXAMINATION: ICD-10-CM

## 2024-04-15 PROCEDURE — 1123F ACP DISCUSS/DSCN MKR DOCD: CPT | Performed by: NURSE PRACTITIONER

## 2024-04-15 PROCEDURE — G0279 TOMOSYNTHESIS, MAMMO: HCPCS

## 2024-04-15 PROCEDURE — 99213 OFFICE O/P EST LOW 20 MIN: CPT | Performed by: NURSE PRACTITIONER

## 2024-04-15 NOTE — PROGRESS NOTES
follow up imaging 4/15/2024.  Stable subtle postsurgical changes upper outer breast anterior to the asymmetry.  - Screening Breast Examination - stable breast examination   - Fibrocystic breast   - History of benign breast biopsy x3       PLAN:   Short interval follow-up is recommended at time of annual mammogram in 6 months with bilateral diagnostic mammogram.  Due 10/2024  Discussed the importance of breast awareness including the importance and technique of self breast exams  Today's imaging was reviewed and the results were discussed with the patient, all questions answered  Education provided for Healthy Lifestyle Recommendations: healthy diet, routine exercise, weight control, decreased alcohol consumption.  Follow up after mammogram            ALDAIR Tillman-[facility]   Surgical Breast Oncology   [phone]     All of the patient's questions were answered at this time however, she was encouraged to call the office with any further inquiries.    Approximately 20 minutes of time were spent in preparation, direct patient contact, counseling, care coordination, documentation and activities otherwise related to this encounter.

## 2024-10-21 ENCOUNTER — HOSPITAL ENCOUNTER (OUTPATIENT)
Dept: MAMMOGRAPHY | Age: 79
Discharge: HOME | End: 2024-10-21
Payer: MEDICARE

## 2024-10-21 ENCOUNTER — OFFICE VISIT (OUTPATIENT)
Dept: SURGERY | Age: 79
End: 2024-10-21
Payer: MEDICARE

## 2024-10-21 VITALS
RESPIRATION RATE: 18 BRPM | BODY MASS INDEX: 32.57 KG/M2 | HEIGHT: 62 IN | OXYGEN SATURATION: 98 % | WEIGHT: 177 LBS | HEART RATE: 74 BPM

## 2024-10-21 VITALS — WEIGHT: 177 LBS | HEIGHT: 62 IN | BODY MASS INDEX: 32.57 KG/M2

## 2024-10-21 DIAGNOSIS — R92.8 ABNORMAL FINDING ON BREAST IMAGING: Primary | ICD-10-CM

## 2024-10-21 DIAGNOSIS — R92.8 ABNORMAL FINDING ON BREAST IMAGING: ICD-10-CM

## 2024-10-21 DIAGNOSIS — Z12.31 SCREENING MAMMOGRAM, ENCOUNTER FOR: Primary | ICD-10-CM

## 2024-10-21 DIAGNOSIS — Z98.890 HISTORY OF BENIGN BREAST BIOPSY: ICD-10-CM

## 2024-10-21 DIAGNOSIS — Z12.39 ENCOUNTER FOR SCREENING BREAST EXAMINATION: ICD-10-CM

## 2024-10-21 PROCEDURE — 1123F ACP DISCUSS/DSCN MKR DOCD: CPT | Performed by: NURSE PRACTITIONER

## 2024-10-21 PROCEDURE — 77066 DX MAMMO INCL CAD BI: CPT

## 2024-10-21 PROCEDURE — 99213 OFFICE O/P EST LOW 20 MIN: CPT | Performed by: NURSE PRACTITIONER

## 2024-10-21 NOTE — PROGRESS NOTES
Surgical Breast Oncology        CC: Abnormal breast imaging follow-up     HPI: Darshana Garcia is a 78 y.o. woman here for 6-month abnormal breast imaging follow-up.  She states that she does not routinely perform self breast evaluations, however has not noticed any new abnormalities such as masses, skin changes, color changes,nipple discharge, or changes to the nipple-areolar complex. No personal history of cancer.  Mammogram today reviewed, discussed with the patient and documented below.       INTERVAL HISTORY:  On 5/17/2016 she underwent bilateral screening mammogram which preliminarily shows no evidence of malignancy.     On 5/25/2017 she underwent bilateral screening mammography. There are no suspicious findings suggestive of malignancy. BI-RADS 1.     On 6/18/2018 she underwent bilateral screening mammography. There are no concerning findings suggestive of malignancy. BI-RADS 1.     On 6/20/2019 she underwent bilateral screening mammography.There are no concerning findings suggestive of malignancy. BI-RADS 2.    On 9/1/2020 she underwent bilateral screening mammography.  Stable postoperative changes noted in bilateral breast.  Stable microcalcifications are noted in both breast.  There are no concerning findings suggestive of malignancy.  BI-RADS 2.    On 9/7/2021 she underwent bilateral screening mammography.  No new concerning findings suggestive of malignancy.  BI-RADS 1.    On 9/21/2022 she had her bilateral screening mammography.  Stable calcifications noted in bilateral breast.  Stable postoperative changes noted on bilateral breast. no new concerning findings suggestive of malignancy.  BI-RADS 2.    On 10/4/2023 she underwent bilateral screening mammography.  0.5 cm asymmetry noted superiorly on the left breast at posterior depth for which additional imaging is recommended with left breast ultrasound.  No concerning findings suggestive of malignancy in the right breast.  BI-RADS 0.     On

## 2025-01-29 ENCOUNTER — APPOINTMENT (OUTPATIENT)
Dept: URBAN - METROPOLITAN AREA CLINIC 170 | Facility: CLINIC | Age: 80
Setting detail: DERMATOLOGY
End: 2025-01-29

## 2025-01-29 DIAGNOSIS — D18.0 HEMANGIOMA: ICD-10-CM | Status: STABLE

## 2025-01-29 DIAGNOSIS — L72.0 EPIDERMAL CYST: ICD-10-CM | Status: STABLE

## 2025-01-29 DIAGNOSIS — D22 MELANOCYTIC NEVI: ICD-10-CM | Status: STABLE

## 2025-01-29 DIAGNOSIS — L82.1 OTHER SEBORRHEIC KERATOSIS: ICD-10-CM | Status: STABLE

## 2025-01-29 DIAGNOSIS — L81.4 OTHER MELANIN HYPERPIGMENTATION: ICD-10-CM | Status: STABLE

## 2025-01-29 DIAGNOSIS — L92.0 GRANULOMA ANNULARE: ICD-10-CM | Status: INADEQUATELY CONTROLLED

## 2025-01-29 PROBLEM — D22.5 MELANOCYTIC NEVI OF TRUNK: Status: ACTIVE | Noted: 2025-01-29

## 2025-01-29 PROBLEM — D18.01 HEMANGIOMA OF SKIN AND SUBCUTANEOUS TISSUE: Status: ACTIVE | Noted: 2025-01-29

## 2025-01-29 PROCEDURE — ? FULL BODY SKIN EXAM

## 2025-01-29 PROCEDURE — ? TREATMENT REGIMEN

## 2025-01-29 PROCEDURE — ? COUNSELING

## 2025-01-29 PROCEDURE — 99213 OFFICE O/P EST LOW 20 MIN: CPT | Mod: 25

## 2025-01-29 PROCEDURE — ? MEDICARE ABN

## 2025-01-29 PROCEDURE — 11900 INJECT SKIN LESIONS </W 7: CPT

## 2025-01-29 PROCEDURE — ? INTRALESIONAL KENALOG

## 2025-01-29 ASSESSMENT — LOCATION ZONE DERM
LOCATION ZONE: TRUNK
LOCATION ZONE: FACE
LOCATION ZONE: ARM

## 2025-01-29 ASSESSMENT — LOCATION SIMPLE DESCRIPTION DERM
LOCATION SIMPLE: LEFT FOREHEAD
LOCATION SIMPLE: LEFT FOREARM
LOCATION SIMPLE: RIGHT LOWER BACK
LOCATION SIMPLE: UPPER BACK
LOCATION SIMPLE: RIGHT UPPER BACK
LOCATION SIMPLE: ABDOMEN

## 2025-01-29 ASSESSMENT — LOCATION DETAILED DESCRIPTION DERM
LOCATION DETAILED: LEFT INFERIOR LATERAL FOREHEAD
LOCATION DETAILED: LEFT PROXIMAL DORSAL FOREARM
LOCATION DETAILED: RIGHT SUPERIOR UPPER BACK
LOCATION DETAILED: RIGHT INFERIOR MEDIAL MIDBACK
LOCATION DETAILED: EPIGASTRIC SKIN
LOCATION DETAILED: INFERIOR THORACIC SPINE

## 2025-01-29 NOTE — HPI: EVALUATION OF SKIN LESION(S)
What Type Of Note Output Would You Prefer (Optional)?: Bullet Format
Hpi Title: Evaluation of Skin Lesions
Additional History: Bumps are growing, not itchy, not used any Triam on it.

## 2025-01-29 NOTE — PROCEDURE: MEDICARE ABN
Procedure (Limit To 20 Characters): Kenalog injection
Payment Option: Option 2: Don't Bill Medicare, patient responsible for payment. Patient cannot appeal Medicare if billed.
Reason?: Additional Information
Detail Level: Detailed
Reason?: non-covered service

## 2025-01-29 NOTE — PROCEDURE: INTRALESIONAL KENALOG
Consent: The risks of atrophy were reviewed with the patient.
Detail Level: Detailed
Include Z78.9 (Other Specified Conditions Influencing Health Status) As An Associated Diagnosis?: No
Kenalog Preparation: Kenalog
Administered By (Optional): Kalpana Kearney, CNP
How Many Mls Were Removed From The 40 Mg/Ml (10ml) Vial When Preparing The Injectable Solution?: 0
Lot # For Kenalog (Optional): [id number]
Treatment Number (Optional): 1
How Many Mls Were Removed From The 10 Mg/Ml (5ml) Vial When Preparing The Injectable Solution?: 0.4
Kenalog Type Of Vial: Multiple Dose
Medical Necessity Clause: This procedure was medically necessary because the lesions that were treated were:
Concentration Of Kenalog Solution Injected (Mg/Ml): 10.0
Which Kenalog Vial Was Used?: Kenalog 10 mg/ml (5 ml vial)
Expiration Date For Kenalog (Optional): 04/2027
Validate Note Data When Using Inventory: Yes
Total Volume (Ccs): 0.2